# Patient Record
Sex: FEMALE
[De-identification: names, ages, dates, MRNs, and addresses within clinical notes are randomized per-mention and may not be internally consistent; named-entity substitution may affect disease eponyms.]

---

## 2017-11-14 ENCOUNTER — CHARTING TRANS (OUTPATIENT)
Dept: OTHER | Age: 82
End: 2017-11-14

## 2017-11-15 ENCOUNTER — CHARTING TRANS (OUTPATIENT)
Dept: OTHER | Age: 82
End: 2017-11-15

## 2018-11-23 ENCOUNTER — IMAGING SERVICES (OUTPATIENT)
Dept: OTHER | Age: 83
End: 2018-11-23

## 2019-01-24 ENCOUNTER — IMAGING SERVICES (OUTPATIENT)
Dept: OTHER | Age: 84
End: 2019-01-24

## 2019-10-25 ENCOUNTER — OFFICE VISIT (OUTPATIENT)
Dept: OTOLARYNGOLOGY | Age: 84
End: 2019-10-25

## 2019-10-25 ENCOUNTER — IMAGING SERVICES (OUTPATIENT)
Dept: GENERAL RADIOLOGY | Age: 84
End: 2019-10-25
Attending: OTOLARYNGOLOGY

## 2019-10-25 VITALS — WEIGHT: 215 LBS

## 2019-10-25 DIAGNOSIS — R51.9 PAIN OF CHEEK: Primary | ICD-10-CM

## 2019-10-25 DIAGNOSIS — K06.9 GINGIVAL DISEASE: ICD-10-CM

## 2019-10-25 DIAGNOSIS — R51.9 PAIN OF CHEEK: ICD-10-CM

## 2019-10-25 PROCEDURE — 70210 X-RAY EXAM OF SINUSES: CPT | Performed by: RADIOLOGY

## 2019-10-25 PROCEDURE — 99204 OFFICE O/P NEW MOD 45 MIN: CPT | Performed by: OTOLARYNGOLOGY

## 2019-10-25 RX ORDER — TRIAMCINOLONE ACETONIDE 0.1 %
PASTE (GRAM) DENTAL
Qty: 1 TUBE | Refills: 0 | Status: SHIPPED | OUTPATIENT
Start: 2019-10-25 | End: 2019-11-08

## 2019-10-26 PROBLEM — E78.2 MIXED HYPERLIPIDEMIA: Status: ACTIVE | Noted: 2017-03-07

## 2019-10-26 PROBLEM — G45.9 TIA (TRANSIENT ISCHEMIC ATTACK): Status: ACTIVE | Noted: 2017-11-29

## 2019-10-26 PROBLEM — N18.30 CHRONIC KIDNEY DISEASE, STAGE 3 (MODERATE): Status: ACTIVE | Noted: 2017-11-16

## 2019-10-26 PROBLEM — I42.0 DILATED CARDIOMYOPATHY (CMD): Status: ACTIVE | Noted: 2017-11-29

## 2019-10-26 PROBLEM — I35.0 NONRHEUMATIC AORTIC VALVE STENOSIS: Status: ACTIVE | Noted: 2017-11-29

## 2019-10-26 PROBLEM — C85.90 LYMPHOMA (CMD): Status: ACTIVE | Noted: 2017-07-13

## 2020-01-03 ENCOUNTER — WALK IN (OUTPATIENT)
Dept: URGENT CARE | Age: 85
End: 2020-01-03

## 2020-01-03 DIAGNOSIS — N39.0 URINARY TRACT INFECTION WITHOUT HEMATURIA, SITE UNSPECIFIED: ICD-10-CM

## 2020-01-03 DIAGNOSIS — R30.0 DYSURIA: Primary | ICD-10-CM

## 2020-01-03 LAB
BACTERIA: ABNORMAL
BILIRUBIN URINE: NEGATIVE
BLOOD URINE: ABNORMAL
CLARITY: ABNORMAL
COLOR: YELLOW
GLUCOSE QUALITATIVE U: NEGATIVE
HYALINE CAST: ABNORMAL
KETONES, URINE: NEGATIVE
LEUKOCYTE ESTERASE URINE: ABNORMAL
MUCOUS: ABNORMAL
NITRITE URINE: NEGATIVE
PH URINE: 6 (ref 5–7)
RED BLOOD CELLS URINE: ABNORMAL (ref 0–3)
SPECIFIC GRAVITY URINE: 1.02 (ref 1–1.03)
SQUAMOUS EPITHELIAL CELLS: ABNORMAL
URINE PROTEIN, QUAL (DIPSTICK): 30
UROBILINOGEN URINE: 2
WHITE BLOOD CELLS URINE: ABNORMAL (ref 0–5)

## 2020-01-03 PROCEDURE — 87088 URINE BACTERIA CULTURE: CPT | Performed by: FAMILY MEDICINE

## 2020-01-03 PROCEDURE — 87086 URINE CULTURE/COLONY COUNT: CPT | Performed by: FAMILY MEDICINE

## 2020-01-03 PROCEDURE — 81003 URINALYSIS AUTO W/O SCOPE: CPT | Performed by: FAMILY MEDICINE

## 2020-01-03 PROCEDURE — 87186 SC STD MICRODIL/AGAR DIL: CPT | Performed by: FAMILY MEDICINE

## 2020-01-03 PROCEDURE — 99202 OFFICE O/P NEW SF 15 MIN: CPT | Performed by: FAMILY MEDICINE

## 2020-01-03 RX ORDER — OMEPRAZOLE 40 MG/1
40 CAPSULE, DELAYED RELEASE ORAL
COMMUNITY

## 2020-01-03 RX ORDER — ATORVASTATIN CALCIUM 20 MG/1
20 TABLET, FILM COATED ORAL NIGHTLY
Refills: 1 | COMMUNITY
Start: 2019-10-22

## 2020-01-03 RX ORDER — CIPROFLOXACIN 250 MG/1
250 TABLET, FILM COATED ORAL 2 TIMES DAILY
Qty: 10 TABLET | Refills: 0 | Status: SHIPPED | OUTPATIENT
Start: 2020-01-03 | End: 2020-01-08

## 2020-01-03 RX ORDER — FUROSEMIDE 20 MG/1
TABLET ORAL
Refills: 3 | COMMUNITY
Start: 2019-12-20

## 2020-01-03 RX ORDER — LEVOTHYROXINE SODIUM 0.07 MG/1
TABLET ORAL
Refills: 0 | COMMUNITY
Start: 2019-10-18

## 2020-01-03 RX ORDER — LISINOPRIL 40 MG/1
40 TABLET ORAL DAILY
Refills: 1 | COMMUNITY
Start: 2019-11-08

## 2020-01-03 RX ORDER — FLUOXETINE HYDROCHLORIDE 20 MG/1
CAPSULE ORAL
Refills: 0 | COMMUNITY
Start: 2019-10-25

## 2020-01-03 RX ORDER — ROSUVASTATIN CALCIUM 5 MG/1
5 TABLET, COATED ORAL
COMMUNITY
End: 2020-01-03 | Stop reason: ALTCHOICE

## 2020-01-03 RX ORDER — METOPROLOL SUCCINATE 100 MG/1
TABLET, EXTENDED RELEASE ORAL
Refills: 3 | COMMUNITY
Start: 2019-11-04

## 2020-01-03 RX ORDER — ACETAMINOPHEN 325 MG/1
650 TABLET ORAL
COMMUNITY

## 2020-01-03 SDOH — HEALTH STABILITY: MENTAL HEALTH: HOW OFTEN DO YOU HAVE A DRINK CONTAINING ALCOHOL?: MONTHLY OR LESS

## 2020-01-03 ASSESSMENT — PAIN SCALES - GENERAL: PAINLEVEL: 0

## 2020-01-06 LAB — FINAL REPORT: ABNORMAL

## 2020-10-30 ENCOUNTER — WALK IN (OUTPATIENT)
Dept: URGENT CARE | Age: 85
End: 2020-10-30

## 2020-10-30 ENCOUNTER — IMAGING SERVICES (OUTPATIENT)
Dept: GENERAL RADIOLOGY | Age: 85
End: 2020-10-30
Attending: INTERNAL MEDICINE

## 2020-10-30 DIAGNOSIS — R07.81 RIB PAIN ON RIGHT SIDE: ICD-10-CM

## 2020-10-30 DIAGNOSIS — R07.81 RIB PAIN ON RIGHT SIDE: Primary | ICD-10-CM

## 2020-10-30 PROCEDURE — 99214 OFFICE O/P EST MOD 30 MIN: CPT | Performed by: INTERNAL MEDICINE

## 2020-10-30 PROCEDURE — 71101 X-RAY EXAM UNILAT RIBS/CHEST: CPT | Performed by: RADIOLOGY

## 2020-10-30 ASSESSMENT — PAIN SCALES - GENERAL: PAINLEVEL: 5-6

## 2021-02-08 ENCOUNTER — TELEPHONE (OUTPATIENT)
Dept: SCHEDULING | Age: 86
End: 2021-02-08

## 2021-02-10 ENCOUNTER — IMMUNIZATION (OUTPATIENT)
Dept: LAB | Age: 86
End: 2021-02-10

## 2021-02-10 DIAGNOSIS — Z23 NEED FOR VACCINATION: Primary | ICD-10-CM

## 2021-02-10 PROCEDURE — 0011A COVID-19 MODERNA VACCINE: CPT

## 2021-02-10 PROCEDURE — 91301 COVID-19 MODERNA VACCINE: CPT

## 2021-03-10 ENCOUNTER — IMMUNIZATION (OUTPATIENT)
Dept: LAB | Age: 86
End: 2021-03-10

## 2021-03-10 DIAGNOSIS — Z23 NEED FOR VACCINATION: Primary | ICD-10-CM

## 2021-03-10 PROCEDURE — 0012A COVID-19 MODERNA VACCINE: CPT

## 2021-03-10 PROCEDURE — 91301 COVID-19 MODERNA VACCINE: CPT

## 2021-05-25 VITALS
OXYGEN SATURATION: 97 % | TEMPERATURE: 97.7 F | HEART RATE: 67 BPM | SYSTOLIC BLOOD PRESSURE: 114 MMHG | TEMPERATURE: 98.3 F | RESPIRATION RATE: 22 BRPM | DIASTOLIC BLOOD PRESSURE: 64 MMHG | RESPIRATION RATE: 24 BRPM | DIASTOLIC BLOOD PRESSURE: 76 MMHG | SYSTOLIC BLOOD PRESSURE: 114 MMHG | HEART RATE: 76 BPM

## 2024-02-17 ENCOUNTER — WALK IN (OUTPATIENT)
Dept: URGENT CARE | Age: 89
End: 2024-02-17

## 2024-02-17 VITALS
RESPIRATION RATE: 18 BRPM | TEMPERATURE: 98.2 F | OXYGEN SATURATION: 97 % | DIASTOLIC BLOOD PRESSURE: 62 MMHG | SYSTOLIC BLOOD PRESSURE: 160 MMHG | HEART RATE: 64 BPM

## 2024-02-17 DIAGNOSIS — R41.89 COGNITIVE AND BEHAVIORAL CHANGES: ICD-10-CM

## 2024-02-17 DIAGNOSIS — R46.89 COGNITIVE AND BEHAVIORAL CHANGES: ICD-10-CM

## 2024-02-17 DIAGNOSIS — R35.0 URINE FREQUENCY: Primary | ICD-10-CM

## 2024-02-17 RX ORDER — CEFUROXIME AXETIL 250 MG/1
250 TABLET ORAL 2 TIMES DAILY
Qty: 14 TABLET | Refills: 0 | Status: SHIPPED | OUTPATIENT
Start: 2024-02-17 | End: 2024-02-24

## 2024-02-18 ENCOUNTER — LAB SERVICES (OUTPATIENT)
Dept: LAB | Age: 89
End: 2024-02-18

## 2024-02-18 DIAGNOSIS — R35.0 URINE FREQUENCY: ICD-10-CM

## 2024-02-18 LAB
APPEARANCE UR: CLEAR
BACTERIA #/AREA URNS HPF: ABNORMAL /HPF
BILIRUB UR QL STRIP: NEGATIVE
COLOR UR: ABNORMAL
GLUCOSE UR STRIP-MCNC: NEGATIVE MG/DL
HGB UR QL STRIP: NEGATIVE
HYALINE CASTS #/AREA URNS LPF: ABNORMAL /LPF
KETONES UR STRIP-MCNC: NEGATIVE MG/DL
LEUKOCYTE ESTERASE UR QL STRIP: ABNORMAL
NITRITE UR QL STRIP: NEGATIVE
PH UR STRIP: 6.5 [PH] (ref 5–7)
PROT UR STRIP-MCNC: NEGATIVE MG/DL
RBC #/AREA URNS HPF: ABNORMAL /HPF
SP GR UR STRIP: 1.01 (ref 1–1.03)
SQUAMOUS #/AREA URNS HPF: ABNORMAL /HPF
UROBILINOGEN UR STRIP-MCNC: 0.2 MG/DL
WBC #/AREA URNS HPF: ABNORMAL /HPF

## 2024-02-18 PROCEDURE — 87086 URINE CULTURE/COLONY COUNT: CPT | Performed by: CLINICAL MEDICAL LABORATORY

## 2024-02-18 PROCEDURE — 81001 URINALYSIS AUTO W/SCOPE: CPT | Performed by: INTERNAL MEDICINE

## 2024-02-19 ENCOUNTER — TELEPHONE (OUTPATIENT)
Dept: URGENT CARE | Age: 89
End: 2024-02-19

## 2024-02-19 LAB — BACTERIA UR CULT: NORMAL

## 2024-03-16 PROCEDURE — 93010 ELECTROCARDIOGRAM REPORT: CPT | Performed by: INTERNAL MEDICINE

## 2024-05-30 ENCOUNTER — APPOINTMENT (OUTPATIENT)
Dept: LAB | Age: 89
End: 2024-05-30

## 2024-06-04 ENCOUNTER — LAB SERVICES (OUTPATIENT)
Dept: LAB | Age: 89
End: 2024-06-04

## 2024-06-04 DIAGNOSIS — R41.82 ALTERED MENTAL STATUS, UNSPECIFIED: ICD-10-CM

## 2024-06-04 LAB
APPEARANCE UR: CLEAR
BILIRUB UR QL STRIP: NEGATIVE
COLOR UR: NORMAL
GLUCOSE UR STRIP-MCNC: NEGATIVE MG/DL
HGB UR QL STRIP: NEGATIVE
KETONES UR STRIP-MCNC: NEGATIVE MG/DL
LEUKOCYTE ESTERASE UR QL STRIP: NEGATIVE
NITRITE UR QL STRIP: NEGATIVE
PH UR STRIP: 6 [PH] (ref 5–7)
PROT UR STRIP-MCNC: NEGATIVE MG/DL
SP GR UR STRIP: 1.02 (ref 1–1.03)
UROBILINOGEN UR STRIP-MCNC: 0.2 MG/DL

## 2024-06-04 PROCEDURE — 81003 URINALYSIS AUTO W/O SCOPE: CPT | Performed by: INTERNAL MEDICINE

## 2024-06-04 PROCEDURE — 87086 URINE CULTURE/COLONY COUNT: CPT | Performed by: CLINICAL MEDICAL LABORATORY

## 2024-06-05 LAB — BACTERIA UR CULT: NORMAL

## 2024-07-30 ENCOUNTER — LAB SERVICES (OUTPATIENT)
Dept: LAB | Age: 89
End: 2024-07-30

## 2024-07-30 DIAGNOSIS — R41.82 AMS (ALTERED MENTAL STATUS): ICD-10-CM

## 2024-07-30 LAB
APPEARANCE UR: CLEAR
BACTERIA #/AREA URNS HPF: ABNORMAL /HPF
BILIRUB UR QL STRIP: NEGATIVE
COLOR UR: YELLOW
GLUCOSE UR STRIP-MCNC: NEGATIVE MG/DL
HGB UR QL STRIP: NEGATIVE
HYALINE CASTS #/AREA URNS LPF: ABNORMAL /LPF
KETONES UR STRIP-MCNC: NEGATIVE MG/DL
LEUKOCYTE ESTERASE UR QL STRIP: NEGATIVE
MUCOUS THREADS URNS QL MICRO: PRESENT
NITRITE UR QL STRIP: NEGATIVE
PH UR STRIP: 6 [PH] (ref 5–7)
PROT UR STRIP-MCNC: NEGATIVE MG/DL
RBC #/AREA URNS HPF: ABNORMAL /HPF
SP GR UR STRIP: 1.02 (ref 1–1.03)
SQUAMOUS #/AREA URNS HPF: ABNORMAL /HPF
UROBILINOGEN UR STRIP-MCNC: 0.2 MG/DL
WBC #/AREA URNS HPF: ABNORMAL /HPF

## 2024-07-30 PROCEDURE — 87086 URINE CULTURE/COLONY COUNT: CPT | Performed by: CLINICAL MEDICAL LABORATORY

## 2024-07-30 PROCEDURE — 81001 URINALYSIS AUTO W/SCOPE: CPT | Performed by: INTERNAL MEDICINE

## 2024-07-31 LAB — BACTERIA UR CULT: NORMAL

## 2024-12-20 DIAGNOSIS — R30.0 DYSURIA: ICD-10-CM

## 2024-12-20 DIAGNOSIS — R41.82 ALTERED MENTAL STATUS, UNSPECIFIED: Primary | ICD-10-CM

## 2024-12-23 ENCOUNTER — LAB SERVICES (OUTPATIENT)
Dept: LAB | Age: 89
End: 2024-12-23

## 2024-12-23 DIAGNOSIS — R41.82 ALTERED MENTAL STATUS, UNSPECIFIED: ICD-10-CM

## 2024-12-23 DIAGNOSIS — R30.0 DYSURIA: ICD-10-CM

## 2024-12-23 LAB
APPEARANCE UR: CLEAR
BACTERIA #/AREA URNS HPF: ABNORMAL /HPF
BILIRUB UR QL STRIP: NEGATIVE
COLOR UR: ABNORMAL
GLUCOSE UR STRIP-MCNC: NEGATIVE MG/DL
HGB UR QL STRIP: NEGATIVE
HYALINE CASTS #/AREA URNS LPF: ABNORMAL /LPF
KETONES UR STRIP-MCNC: NEGATIVE MG/DL
LEUKOCYTE ESTERASE UR QL STRIP: ABNORMAL
MUCOUS THREADS URNS QL MICRO: PRESENT
NITRITE UR QL STRIP: NEGATIVE
PH UR STRIP: 6 [PH] (ref 5–7)
PROT UR STRIP-MCNC: NEGATIVE MG/DL
RBC #/AREA URNS HPF: ABNORMAL /HPF
SP GR UR STRIP: 1.02 (ref 1–1.03)
SQUAMOUS #/AREA URNS HPF: ABNORMAL /HPF
UROBILINOGEN UR STRIP-MCNC: 0.2 MG/DL
WBC #/AREA URNS HPF: ABNORMAL /HPF

## 2024-12-23 PROCEDURE — 87086 URINE CULTURE/COLONY COUNT: CPT | Performed by: CLINICAL MEDICAL LABORATORY

## 2024-12-23 PROCEDURE — 81001 URINALYSIS AUTO W/SCOPE: CPT | Performed by: INTERNAL MEDICINE

## 2024-12-24 LAB — BACTERIA UR CULT: NORMAL

## 2025-03-27 ENCOUNTER — APPOINTMENT (OUTPATIENT)
Dept: PODIATRY | Age: OVER 89
End: 2025-03-27

## 2025-03-27 DIAGNOSIS — B49 FUNGAL INFECTION: Primary | ICD-10-CM

## 2025-03-27 RX ORDER — ALENDRONATE SODIUM 70 MG/1
TABLET ORAL
COMMUNITY
Start: 2024-11-27

## 2025-03-27 RX ORDER — CLOPIDOGREL BISULFATE 75 MG/1
75 TABLET ORAL
COMMUNITY
Start: 2025-03-16 | End: 2025-04-15

## 2025-03-27 RX ORDER — CICLOPIROX OLAMINE 7.7 MG/G
CREAM TOPICAL 2 TIMES DAILY
Qty: 60 G | Refills: 0 | Status: SHIPPED | OUTPATIENT
Start: 2025-03-27

## 2025-03-27 RX ORDER — NITROFURANTOIN MACROCRYSTALS 50 MG/1
50 CAPSULE ORAL
COMMUNITY

## 2025-04-23 ENCOUNTER — HOSPITAL ENCOUNTER (OUTPATIENT)
Age: OVER 89
Discharge: HOME | End: 2025-04-23
Payer: MEDICARE

## 2025-04-23 VITALS
DIASTOLIC BLOOD PRESSURE: 71 MMHG | SYSTOLIC BLOOD PRESSURE: 132 MMHG | RESPIRATION RATE: 18 BRPM | OXYGEN SATURATION: 99 % | TEMPERATURE: 98 F | HEART RATE: 70 BPM

## 2025-04-23 DIAGNOSIS — M79.5 FOREIGN BODY (FB) IN SOFT TISSUE: Primary | ICD-10-CM

## 2025-04-23 PROCEDURE — 99204 OFFICE O/P NEW MOD 45 MIN: CPT | Performed by: NURSE PRACTITIONER

## 2025-04-23 RX ORDER — OMEPRAZOLE 10 MG/1
CAPSULE, DELAYED RELEASE ORAL
COMMUNITY

## 2025-04-23 RX ORDER — NITROFURANTOIN MACROCRYSTALS 50 MG/1
50 CAPSULE ORAL AS DIRECTED
COMMUNITY

## 2025-04-23 RX ORDER — METOPROLOL SUCCINATE 25 MG/1
CAPSULE, EXTENDED RELEASE ORAL
COMMUNITY

## 2025-04-23 RX ORDER — ATORVASTATIN CALCIUM 10 MG
10 TABLET ORAL DAILY
COMMUNITY
Start: 2025-04-07

## 2025-04-23 RX ORDER — ALENDRONATE SODIUM AND CHOLECALCIFEROL 70; 2800 MG/1; [IU]/1
1 TABLET ORAL
COMMUNITY

## 2025-04-23 RX ORDER — ATORVASTATIN CALCIUM 20 MG/1
20 TABLET, FILM COATED ORAL NIGHTLY
COMMUNITY
Start: 2025-04-07

## 2025-04-23 RX ORDER — CEPHALEXIN 500 MG/1
500 CAPSULE ORAL 4 TIMES DAILY
Qty: 40 CAPSULE | Refills: 0 | Status: SHIPPED | OUTPATIENT
Start: 2025-04-23 | End: 2025-05-03

## 2025-04-23 RX ORDER — FERROUS SULFATE 325(65) MG
325 TABLET ORAL DAILY
COMMUNITY

## 2025-04-23 RX ORDER — LEVOTHYROXINE SODIUM 88 UG/1
88 TABLET ORAL EVERY MORNING
COMMUNITY
Start: 2024-11-04

## 2025-04-23 RX ORDER — CLOPIDOGREL BISULFATE 75 MG/1
75 TABLET ORAL NIGHTLY
COMMUNITY
Start: 2025-03-16

## 2025-04-23 RX ORDER — MULTIVITAMIN
1 TABLET ORAL DAILY
COMMUNITY

## 2025-04-23 RX ORDER — LISINOPRIL 20 MG/1
20 TABLET ORAL DAILY
COMMUNITY
Start: 2024-12-27

## 2025-04-23 NOTE — ED PROVIDER NOTES
Patient Seen in: [facility]      History     Chief Complaint   Patient presents with    Foot Pain     Stated Complaint: L Heel: Black Dot on it, Pain when Walks    Subjective:   HPI    Augstina is a 89-year-old female who is here today with complaints of a possible foreign body to the left heel.  Reports that she had been having pain for a few weeks however just noticed a black spot on the bottom of her foot.  Reports that the pain is more intense when she is walking.  She denies any fevers, chills.  She denies any known foreign bodies  History of Present Illness               Objective:     History reviewed. No pertinent past medical history.           No pertinent past surgical history.              No pertinent social history.            Review of Systems    Positive for stated complaint: L Heel: Black Dot on it, Pain when Walks  Other systems are as noted in HPI.  Constitutional and vital signs reviewed.      All other systems reviewed and negative except as noted above.                  Physical Exam     ED Triage Vitals [04/23/25 1457]   /71   Pulse 70   Resp 18   Temp 97.8 °F (36.6 °C)   Temp src    SpO2 99 %   O2 Device None (Room air)       Current Vitals:   Vital Signs  BP: 132/71  Pulse: 70  Resp: 18  Temp: 97.8 °F (36.6 °C)    Oxygen Therapy  SpO2: 99 %  O2 Device: None (Room air)        Physical Exam  VS: Vital signs reviewed. O2 saturation within normal limits for this patient     General: Patient is awake and alert, oriented to person, place and time. Not in acute distress.      HEENT: Head is normocephalic atraumatic. Pupils reactive bilaterally.  EOMs intact.     Lungs: No respiratory distress noted    Extremities: No edema.  Pulses 2+ extremities.   Brisk capillary refill noted.      Skin: Normal skin turgor patient has a circular black, foreign body noted with a very small amount of purulent drainage surrounding the area.  No surrounding erythema, no streaking,    CNS: Moves all 4  extremities.  Interacts appropriately.  No tremor.  No gait abnormality    Differential diagnosis: Blister, puncture wound, foreign body abscess    Physical Exam                ED Course   Labs Reviewed - No data to display       Results            I have personally  reviewed available prior medical records for any recent pertinent discharge summaries/testing. Patient/family updated on results and plan, a verbalized understanding and agreement with the plan.  I explained to the patient that emergent conditions may arise and to go to the ER for new, worsening or any persistent conditions. I've explained the importance of taking all medicatons as prescribed, follow up, and return precuations,  All questions answered.    Please note that this report has been produced using speech recognition software and may contain errors related to that system including, but not limited to, errors in grammar, punctuation, and spelling, as well as words and phrases that possibly may have been recognized inappropriately.  If there are any questions or concerns, contact the dictating provider for clarification.                     MDM      Patient is well-appearing, well-hydrated, well-nourished, nontoxic, there is no distress noted.  Patient does have what appears to be a foreign body in the left heel, an 18-gauge needle was used to deroofed the skin, exposing the foreign body.  A very small amount of purulent drainage was removed from the area along with a foreign body.  There was no postprocedure bleeding.  The wound was cleansed, bacitracin was applied with a Band-Aid.  Patient discharged home with Keflex, encouraged to keep the area clean and dry.  Information to follow-up with podiatry, and to return to the emergency department with any worsening symptoms or concern        Medical Decision Making      Disposition and Plan     Clinical Impression:  1. Foreign body (FB) in soft tissue         Disposition:  Discharge  4/23/2025  3:08  pm    Follow-up:  Trinidad Alvarado, DPM  [address] [place] [id number]  [phone]                Medications Prescribed:  Discharge Medication List as of 4/23/2025  3:09 PM        START taking these medications    Details   cephALEXin 500 MG Oral Cap Take 1 capsule (500 mg total) by mouth 4 (four) times daily for 10 days., Normal, Disp-40 capsule, R-0             Supplementary Documentation:

## 2025-07-07 ENCOUNTER — APPOINTMENT (OUTPATIENT)
Dept: PODIATRY | Age: OVER 89
End: 2025-07-07

## 2025-07-07 DIAGNOSIS — M20.42 HAMMERTOES OF BOTH FEET: ICD-10-CM

## 2025-07-07 DIAGNOSIS — M20.41 HAMMERTOES OF BOTH FEET: ICD-10-CM

## 2025-07-07 DIAGNOSIS — R60.0 EDEMA OF BOTH LOWER EXTREMITIES: ICD-10-CM

## 2025-07-07 DIAGNOSIS — B35.1 NAIL FUNGUS: Primary | ICD-10-CM

## 2025-07-07 PROCEDURE — 99213 OFFICE O/P EST LOW 20 MIN: CPT

## 2025-07-07 RX ORDER — DONEPEZIL HYDROCHLORIDE 5 MG/1
5 TABLET, FILM COATED ORAL NIGHTLY
COMMUNITY
Start: 2025-04-29

## 2025-07-07 RX ORDER — LISINOPRIL 20 MG/1
20 TABLET ORAL DAILY
COMMUNITY
Start: 2025-06-09

## 2025-07-07 RX ORDER — METOPROLOL SUCCINATE 50 MG/1
50 TABLET, EXTENDED RELEASE ORAL
COMMUNITY
Start: 2024-11-12

## 2025-07-07 RX ORDER — LEVOTHYROXINE SODIUM 88 UG/1
TABLET ORAL
COMMUNITY
Start: 2025-05-02

## 2025-07-07 RX ORDER — CEPHALEXIN 500 MG/1
500 CAPSULE ORAL 4 TIMES DAILY
COMMUNITY
Start: 2025-04-23

## 2025-07-07 RX ORDER — ALENDRONATE SODIUM AND CHOLECALCIFEROL 70; 2800 MG/1; [IU]/1
1 TABLET ORAL
COMMUNITY

## 2025-10-07 ENCOUNTER — APPOINTMENT (OUTPATIENT)
Dept: PODIATRY | Age: OVER 89
End: 2025-10-07